# Patient Record
Sex: MALE | Employment: UNEMPLOYED | URBAN - METROPOLITAN AREA
[De-identification: names, ages, dates, MRNs, and addresses within clinical notes are randomized per-mention and may not be internally consistent; named-entity substitution may affect disease eponyms.]

---

## 2024-06-24 ENCOUNTER — OFFICE VISIT (OUTPATIENT)
Dept: PRIMARY CARE | Facility: CLINIC | Age: 24
End: 2024-06-24
Payer: MEDICARE

## 2024-06-24 VITALS — WEIGHT: 148 LBS

## 2024-06-24 DIAGNOSIS — Z00.00 HEALTH CARE MAINTENANCE: Primary | ICD-10-CM

## 2024-06-24 NOTE — PROGRESS NOTES
Subjective   Patient ID: Jayson Jimenez is a 24 y.o. male who presents for No chief complaint on file..    HPI follow-up visit  no  Chest pain no  Shortness of breath brought some paperwork to be filled out for his employer    Review of Systems    Objective   Wt 67.1 kg (148 lb)     Physical Exam vital signs noted respiratory rate 12 pulse 72 height 67 inches alert and oriented x 3 breathing unlabored not otherwise examined    Assessment/Plan     Impression paperwork completion  Plan did not want or need additional blood work Forms completed may recheck at any time and for next regular visit